# Patient Record
Sex: FEMALE | Race: WHITE
[De-identification: names, ages, dates, MRNs, and addresses within clinical notes are randomized per-mention and may not be internally consistent; named-entity substitution may affect disease eponyms.]

---

## 2019-07-10 ENCOUNTER — FORM ENCOUNTER (OUTPATIENT)
Age: 59
End: 2019-07-10

## 2019-07-14 ENCOUNTER — FORM ENCOUNTER (OUTPATIENT)
Age: 59
End: 2019-07-14

## 2020-02-12 ENCOUNTER — FORM ENCOUNTER (OUTPATIENT)
Age: 60
End: 2020-02-12

## 2020-07-08 ENCOUNTER — FORM ENCOUNTER (OUTPATIENT)
Age: 60
End: 2020-07-08

## 2020-10-20 ENCOUNTER — APPOINTMENT (OUTPATIENT)
Dept: SURGERY | Facility: CLINIC | Age: 60
End: 2020-10-20
Payer: COMMERCIAL

## 2020-10-20 VITALS — HEART RATE: 72 BPM | DIASTOLIC BLOOD PRESSURE: 82 MMHG | SYSTOLIC BLOOD PRESSURE: 126 MMHG | OXYGEN SATURATION: 100 %

## 2020-10-20 VITALS — TEMPERATURE: 97.4 F

## 2020-10-20 DIAGNOSIS — Z86.69 PERSONAL HISTORY OF OTHER DISEASES OF THE NERVOUS SYSTEM AND SENSE ORGANS: ICD-10-CM

## 2020-10-20 DIAGNOSIS — Z78.9 OTHER SPECIFIED HEALTH STATUS: ICD-10-CM

## 2020-10-20 PROCEDURE — 99204 OFFICE O/P NEW MOD 45 MIN: CPT

## 2020-10-20 PROCEDURE — 99072 ADDL SUPL MATRL&STAF TM PHE: CPT

## 2020-10-20 NOTE — PHYSICAL EXAM
[Normal Thyroid] : the thyroid was normal [Normal Breath Sounds] : Normal breath sounds [Normal Heart Sounds] : normal heart sounds [Normal Rate and Rhythm] : normal rate and rhythm [Oriented to Person] : oriented to person [No Rash or Lesion] : No rash or lesion [Oriented to Place] : oriented to place [Oriented to Time] : oriented to time [Calm] : calm [JVD] : no jugular venous distention  [Carotid Bruits] : no carotid bruits [Abdominal Masses] : No abdominal masses [Abdomen Tenderness] : ~T ~M No abdominal tenderness [Tender] : was nontender [Enlarged] : not enlarged [de-identified] : well developed white female in no acute distress [de-identified] : noninjected and non icteric [de-identified] : without adenopathy [de-identified] : refused [de-identified] : normal bowel sounds, without distension or tenderness [de-identified] : very tender over the left internal ring, without palpable hernia [de-identified] : refused [de-identified] : without calf pain or swelling

## 2020-10-20 NOTE — HISTORY OF PRESENT ILLNESS
[de-identified] : left inguinal pain [de-identified] : 60 year old white female who presents c/o left groin pain to the past five months.  Pt states her pain is constant and does not radiate. It decreases with walking, no alleviating factors. She has not noticed a lump or swelling.  She has had no change in her bowel habits.  Pt was seen by GYN, told not GYN. She was also seen by GI who did a CT scan that was negative and a colonoscopy.  She denies urinary symptoms.

## 2020-11-19 ENCOUNTER — APPOINTMENT (OUTPATIENT)
Dept: SURGERY | Facility: CLINIC | Age: 60
End: 2020-11-19
Payer: COMMERCIAL

## 2020-11-19 VITALS — TEMPERATURE: 97.3 F

## 2020-11-19 PROCEDURE — 99213 OFFICE O/P EST LOW 20 MIN: CPT

## 2020-11-19 NOTE — HISTORY OF PRESENT ILLNESS
[de-identified] : left inguinal pain [de-identified] : pt improved, decreased pain, normal GI functioning, denies any lumps or swelling

## 2020-11-19 NOTE — PHYSICAL EXAM
[Normal Breath Sounds] : Normal breath sounds [Normal Heart Sounds] : normal heart sounds [Normal Rate and Rhythm] : normal rate and rhythm [Abdominal Masses] : No abdominal masses [Abdomen Tenderness] : ~T ~M No abdominal tenderness [Tender] : was nontender [Enlarged] : not enlarged [No Rash or Lesion] : No rash or lesion [Alert] : alert [Oriented to Person] : oriented to person [Oriented to Place] : oriented to place [Calm] : calm [de-identified] : well developed white female in no acute distress [de-identified] : without adenopathy [de-identified] : benign [de-identified] : without palpable hernia [de-identified] : without calf pain or swelling

## 2020-12-03 ENCOUNTER — TRANSCRIPTION ENCOUNTER (OUTPATIENT)
Age: 60
End: 2020-12-03

## 2021-03-11 ENCOUNTER — APPOINTMENT (OUTPATIENT)
Dept: SURGERY | Facility: CLINIC | Age: 61
End: 2021-03-11
Payer: COMMERCIAL

## 2021-03-11 VITALS — TEMPERATURE: 97.8 F

## 2021-03-11 PROCEDURE — 99072 ADDL SUPL MATRL&STAF TM PHE: CPT

## 2021-03-11 PROCEDURE — 99213 OFFICE O/P EST LOW 20 MIN: CPT

## 2021-03-11 NOTE — HISTORY OF PRESENT ILLNESS
[de-identified] : left inguinal pain [de-identified] : pt was doing well till one week ago when she did heavy lifting and developed left groin pain. She 'felt a tearing'. She denies any lumps or swelling. Normal GI functioning.

## 2021-03-11 NOTE — PHYSICAL EXAM
[Normal Breath Sounds] : Normal breath sounds [Normal Heart Sounds] : normal heart sounds [No Rash or Lesion] : No rash or lesion [Alert] : alert [Oriented to Person] : oriented to person [Oriented to Place] : oriented to place [Oriented to Time] : oriented to time [Calm] : calm [de-identified] : well developed white female in no acute distress [de-identified] : normal bowel sounds, without distension or tenderness. [de-identified] : without swelling or palpable hernias [de-identified] : without calf pain or swelling

## 2021-04-08 ENCOUNTER — APPOINTMENT (OUTPATIENT)
Dept: SURGERY | Facility: CLINIC | Age: 61
End: 2021-04-08
Payer: COMMERCIAL

## 2021-04-08 VITALS — TEMPERATURE: 97.8 F

## 2021-04-08 PROCEDURE — 99072 ADDL SUPL MATRL&STAF TM PHE: CPT

## 2021-04-08 PROCEDURE — 99212 OFFICE O/P EST SF 10 MIN: CPT

## 2021-04-08 NOTE — PHYSICAL EXAM
[Normal Breath Sounds] : Normal breath sounds [Normal Heart Sounds] : normal heart sounds [Normal Rate and Rhythm] : normal rate and rhythm [Calm] : calm [de-identified] : well developed female in no acute distress [de-identified] : presently without tenderness, without palpable hernia [de-identified] : normal bowel sounds, without distension or tenderness. [de-identified] : without calf pain or swelling

## 2021-04-08 NOTE — HISTORY OF PRESENT ILLNESS
[de-identified] : left inguinal pain [de-identified] : pt improved with decreased pain, without swelling or lumps, Normal GI functioning, without fevers or chills.

## 2021-06-14 ENCOUNTER — FORM ENCOUNTER (OUTPATIENT)
Age: 61
End: 2021-06-14

## 2021-07-02 ENCOUNTER — NON-APPOINTMENT (OUTPATIENT)
Age: 61
End: 2021-07-02

## 2021-07-07 ENCOUNTER — APPOINTMENT (OUTPATIENT)
Dept: SPEECH THERAPY | Facility: CLINIC | Age: 61
End: 2021-07-07

## 2021-07-07 ENCOUNTER — OUTPATIENT (OUTPATIENT)
Dept: OUTPATIENT SERVICES | Facility: HOSPITAL | Age: 61
LOS: 1 days | Discharge: HOME | End: 2021-07-07

## 2021-07-07 DIAGNOSIS — Z98.89 OTHER SPECIFIED POSTPROCEDURAL STATES: Chronic | ICD-10-CM

## 2021-07-15 ENCOUNTER — APPOINTMENT (OUTPATIENT)
Dept: SURGERY | Facility: CLINIC | Age: 61
End: 2021-07-15
Payer: COMMERCIAL

## 2021-07-15 VITALS — TEMPERATURE: 98.5 F

## 2021-07-15 PROCEDURE — 99212 OFFICE O/P EST SF 10 MIN: CPT

## 2021-07-15 PROCEDURE — 99072 ADDL SUPL MATRL&STAF TM PHE: CPT

## 2021-07-15 NOTE — HISTORY OF PRESENT ILLNESS
[de-identified] : left inguinal pain [de-identified] : pt states that her pain has lessened but she still has occasional pain in the left lower quadrant. She does not feel any lumps or swelling in the area. She has not had any changes in her bowel habits.

## 2021-07-15 NOTE — PHYSICAL EXAM
[Normal Breath Sounds] : Normal breath sounds [Normal Heart Sounds] : normal heart sounds [Normal Rate and Rhythm] : normal rate and rhythm [No Rash or Lesion] : No rash or lesion [Alert] : alert [Oriented to Person] : oriented to person [Oriented to Place] : oriented to place [Oriented to Time] : oriented to time [Calm] : calm [de-identified] : pt in no distress [de-identified] : normal bowel sounds, without distension or tenderness [de-identified] : left groin without adenopathy or hernia, right side also normal [de-identified] : pt refused [de-identified] : without calf pain or swelling

## 2021-07-17 ENCOUNTER — FORM ENCOUNTER (OUTPATIENT)
Age: 61
End: 2021-07-17

## 2021-08-02 DIAGNOSIS — R42 DIZZINESS AND GIDDINESS: ICD-10-CM

## 2021-08-13 ENCOUNTER — APPOINTMENT (OUTPATIENT)
Dept: MRI IMAGING | Facility: HOSPITAL | Age: 61
End: 2021-08-13
Payer: COMMERCIAL

## 2021-08-13 ENCOUNTER — OUTPATIENT (OUTPATIENT)
Dept: OUTPATIENT SERVICES | Facility: HOSPITAL | Age: 61
LOS: 1 days | End: 2021-08-13
Payer: COMMERCIAL

## 2021-08-13 DIAGNOSIS — Z00.8 ENCOUNTER FOR OTHER GENERAL EXAMINATION: ICD-10-CM

## 2021-08-13 DIAGNOSIS — Z98.89 OTHER SPECIFIED POSTPROCEDURAL STATES: Chronic | ICD-10-CM

## 2021-08-13 PROCEDURE — 73718 MRI LOWER EXTREMITY W/O DYE: CPT

## 2021-08-13 PROCEDURE — 73718 MRI LOWER EXTREMITY W/O DYE: CPT | Mod: 26,LT

## 2022-01-20 ENCOUNTER — APPOINTMENT (OUTPATIENT)
Dept: SURGERY | Facility: CLINIC | Age: 62
End: 2022-01-20
Payer: COMMERCIAL

## 2022-01-20 VITALS — TEMPERATURE: 95 F

## 2022-01-20 PROCEDURE — 99212 OFFICE O/P EST SF 10 MIN: CPT

## 2022-01-20 NOTE — PHYSICAL EXAM
[Normal Breath Sounds] : Normal breath sounds [Normal Heart Sounds] : normal heart sounds [Normal Rate and Rhythm] : normal rate and rhythm [No Rash or Lesion] : No rash or lesion [Alert] : alert [Oriented to Person] : oriented to person [Oriented to Place] : oriented to place [Oriented to Time] : oriented to time [Calm] : calm [de-identified] : well developed white female in no distress [de-identified] : normal bowel sounds, without distension or tenderness [de-identified] : with mild left groin soreness, without palpable hernia [de-identified] : without calf pain or swelling

## 2022-01-20 NOTE — HISTORY OF PRESENT ILLNESS
[de-identified] : left inguinal pain [de-identified] : pt has been doing well till a few weeks ago when she strained herself. Since then the pain has been decreasing. She does not feel any lumps or swelling.

## 2022-06-01 ENCOUNTER — FORM ENCOUNTER (OUTPATIENT)
Age: 62
End: 2022-06-01

## 2022-08-12 ENCOUNTER — APPOINTMENT (OUTPATIENT)
Dept: OBGYN | Facility: CLINIC | Age: 62
End: 2022-08-12

## 2022-08-12 VITALS
RESPIRATION RATE: 12 BRPM | DIASTOLIC BLOOD PRESSURE: 70 MMHG | SYSTOLIC BLOOD PRESSURE: 110 MMHG | HEART RATE: 79 BPM | HEIGHT: 65 IN | OXYGEN SATURATION: 97 % | BODY MASS INDEX: 30.82 KG/M2 | WEIGHT: 185 LBS

## 2022-08-12 DIAGNOSIS — Z78.9 OTHER SPECIFIED HEALTH STATUS: ICD-10-CM

## 2022-08-12 DIAGNOSIS — Z83.438 FAMILY HISTORY OF OTHER DISORDER OF LIPOPROTEIN METABOLISM AND OTHER LIPIDEMIA: ICD-10-CM

## 2022-08-12 DIAGNOSIS — Z82.49 FAMILY HISTORY OF ISCHEMIC HEART DISEASE AND OTHER DISEASES OF THE CIRCULATORY SYSTEM: ICD-10-CM

## 2022-08-12 DIAGNOSIS — N95.2 POSTMENOPAUSAL ATROPHIC VAGINITIS: ICD-10-CM

## 2022-08-12 DIAGNOSIS — N94.10 UNSPECIFIED DYSPAREUNIA: ICD-10-CM

## 2022-08-12 LAB
DATE COLLECTED: NORMAL
HEMOCCULT SP1 STL QL: NEGATIVE

## 2022-08-12 PROCEDURE — G0444 DEPRESSION SCREEN ANNUAL: CPT | Mod: 59

## 2022-08-12 PROCEDURE — 99396 PREV VISIT EST AGE 40-64: CPT | Mod: 25

## 2022-08-12 PROCEDURE — 82270 OCCULT BLOOD FECES: CPT

## 2022-08-12 NOTE — PLAN
[FreeTextEntry1] : 1)  Self breast exam instructions, calcium supplementation discussed with the patient.\par 2)  Mammography, lipid profile assessment, TSH screening, fasting glucose testing, colonoscopy screening were discussed with the patient.  Vitamin D supplementation\par 3)  Maintain healthy weight.\par 4)  Regular health maintenance with PCP.\par 5)  Remain tobacco free.\par 6)  Limit alcohol intake to less than 5 drinks per week.\par 7)  Osteoporosis screening.\par 8)  Annual cholesterol screening.\par 9)  Annual influenza vaccine.The importance of routine physical activity was reviewed and a goal of 150 minutes of moderately vigorous exercise per week was endorsed.\par Yearly breast cancer screening with no current clinical or radiographic concerns.  Plan continued yearly imaging and breast follow up, sooner as needed.  I counseled the patient on current recommendations to reduce breast cancer risk including but not inclusive to regular exercise 20-30 minutes 3-4 times a week, low fat diet, limiting alcohol consumption, maintenance of ideal body weight, yearly imaging and self breast awareness.  Questions answered.  I encouraged in light of Covid 19, social distancing, frequent hand washing and precautions to stay healthy.\par \par  vaginal atrophy discussed\par Dyspareunia discussed

## 2022-08-12 NOTE — HISTORY OF PRESENT ILLNESS
[N] : Patient does not use contraception [Y] : Positive pregnancy history [Yes] : pregnancy [Regular Cycle Intervals] : periods have been regular [Menarche Age: ____] : age at menarche was [unfilled] [Currently In Menopause] : currently in menopause [Experiencing Menopausal Sxs] : experiencing menopausal symptoms [Currently Active] : currently active [Men] : men [No] : No [TextBox_4] : annual [Mammogramdate] : 08/22 [BreastSonogramDate] : 08/22 [PapSmeardate] : 08/21 [BoneDensityDate] : 01/18 [ColonoscopyDate] : 01/20 [TextBox_78] : no Hx [LMPDate] : 08/2014 [MensesFreq] : 30 [MensesLength] : 7 [PGHxTotal] : 2 [TextBox_6] : 08/2014 [TextBox_9] : 12 [FreeTextEntry1] : 08/2014

## 2022-08-14 LAB
HPV 16 E6+E7 MRNA CVX QL NAA+PROBE: NOT DETECTED
HPV18+45 E6+E7 MRNA CVX QL NAA+PROBE: NOT DETECTED

## 2022-08-18 LAB — CYTOLOGY CVX/VAG DOC THIN PREP: ABNORMAL

## 2023-02-28 ENCOUNTER — APPOINTMENT (OUTPATIENT)
Dept: SURGERY | Facility: CLINIC | Age: 63
End: 2023-02-28
Payer: COMMERCIAL

## 2023-02-28 VITALS
OXYGEN SATURATION: 98 % | WEIGHT: 180 LBS | SYSTOLIC BLOOD PRESSURE: 121 MMHG | BODY MASS INDEX: 29.99 KG/M2 | HEART RATE: 69 BPM | DIASTOLIC BLOOD PRESSURE: 77 MMHG | HEIGHT: 65 IN

## 2023-02-28 PROCEDURE — 99212 OFFICE O/P EST SF 10 MIN: CPT

## 2023-02-28 NOTE — PHYSICAL EXAM
[JVD] : no jugular venous distention  [Normal Thyroid] : the thyroid was normal [Carotid Bruits] : no carotid bruits [Normal Breath Sounds] : Normal breath sounds [Normal Heart Sounds] : normal heart sounds [Normal Rate and Rhythm] : normal rate and rhythm [Calm] : calm [de-identified] : well developed white female in no acute distress [de-identified] : nonicteric [de-identified] : without adenopathy [de-identified] : normal bowel sounds, without distension with left LLQ and groin pain [de-identified] : left groin pain without palpable hernia [de-identified] : without calf pain or swelling

## 2023-02-28 NOTE — HISTORY OF PRESENT ILLNESS
[de-identified] : left inguinal pain [de-identified] : pt c/o increased left groin pain over the last week. It increases with even light touch. She denies any lumps or swelling in the area. No nausea or vomiting or changes in her bowel habits.

## 2023-02-28 NOTE — REVIEW OF SYSTEMS
[Abdominal Pain] : no abdominal pain [Vomiting] : no vomiting [Constipation] : no constipation [Diarrhea] : no diarrhea [Heartburn] : heartburn [Melena] : no melena [Negative] : Heme/Lymph

## 2023-02-28 NOTE — ASSESSMENT
[FreeTextEntry1] : I am sending pt for a ct scan of her abdomen and pelvis with a marker over the tender area.

## 2023-05-16 ENCOUNTER — APPOINTMENT (OUTPATIENT)
Dept: ORTHOPEDIC SURGERY | Facility: CLINIC | Age: 63
End: 2023-05-16

## 2023-08-30 DIAGNOSIS — Z13.820 ENCOUNTER FOR SCREENING FOR OSTEOPOROSIS: ICD-10-CM

## 2023-09-01 ENCOUNTER — APPOINTMENT (OUTPATIENT)
Dept: OBGYN | Facility: CLINIC | Age: 63
End: 2023-09-01
Payer: COMMERCIAL

## 2023-09-01 VITALS
HEART RATE: 68 BPM | WEIGHT: 179 LBS | SYSTOLIC BLOOD PRESSURE: 120 MMHG | BODY MASS INDEX: 29.82 KG/M2 | HEIGHT: 65 IN | DIASTOLIC BLOOD PRESSURE: 76 MMHG | OXYGEN SATURATION: 98 %

## 2023-09-01 DIAGNOSIS — Z01.419 ENCOUNTER FOR GYNECOLOGICAL EXAMINATION (GENERAL) (ROUTINE) W/OUT ABNORMAL FINDINGS: ICD-10-CM

## 2023-09-01 DIAGNOSIS — R07.89 OTHER CHEST PAIN: ICD-10-CM

## 2023-09-01 DIAGNOSIS — S76.212A STRAIN OF ADDUCTOR MUSCLE, FASCIA AND TENDON OF LEFT THIGH, INITIAL ENCOUNTER: ICD-10-CM

## 2023-09-01 DIAGNOSIS — Z13.31 ENCOUNTER FOR SCREENING FOR DEPRESSION: ICD-10-CM

## 2023-09-01 DIAGNOSIS — S76.212D STRAIN OF ADDUCTOR MUSCLE, FASCIA AND TENDON OF LEFT THIGH, SUBSEQUENT ENCOUNTER: ICD-10-CM

## 2023-09-01 DIAGNOSIS — Z12.39 ENCOUNTER FOR OTHER SCREENING FOR MALIGNANT NEOPLASM OF BREAST: ICD-10-CM

## 2023-09-01 DIAGNOSIS — Z12.11 ENCOUNTER FOR SCREENING FOR MALIGNANT NEOPLASM OF COLON: ICD-10-CM

## 2023-09-01 DIAGNOSIS — Z12.4 ENCOUNTER FOR SCREENING FOR MALIGNANT NEOPLASM OF CERVIX: ICD-10-CM

## 2023-09-01 DIAGNOSIS — R10.32 LEFT LOWER QUADRANT PAIN: ICD-10-CM

## 2023-09-01 PROCEDURE — 82270 OCCULT BLOOD FECES: CPT

## 2023-09-01 PROCEDURE — 99396 PREV VISIT EST AGE 40-64: CPT

## 2023-09-01 NOTE — HISTORY OF PRESENT ILLNESS
[Y] : Patient is sexually active [LMP unknown] : LMP unknown [Hot Flashes] : hot flashes [unknown] : Patient is unsure of the date of her LMP [Currently Active] : currently active [FreeTextEntry1] : Pt is here for annual exam. Pt states that she has left breast pain on the side and behind the nipple. The patient presents today for a routine GYN exam.  We reviewed together in detail her past medical and surgical histories, allergies and medication usage, social and family history.   All questions were answered in easy to understand language.  [Mammogramdate] : 08/23 [BreastSonogramDate] : 08/23 [PapSmeardate] : 08/22 [ColonoscopyDate] : 01/20 [BoneDensityDate] : 08/22

## 2023-09-01 NOTE — PHYSICAL EXAM
[Chaperone Present] : A chaperone was present in the examining room during all aspects of the physical examination [Appropriately responsive] : appropriately responsive [Alert] : alert [No Acute Distress] : no acute distress [No Lymphadenopathy] : no lymphadenopathy [Soft] : soft [Non-tender] : non-tender [Non-distended] : non-distended [No HSM] : No HSM [No Lesions] : no lesions [No Mass] : no mass [Oriented x3] : oriented x3 [Examination Of The Breasts] : a normal appearance [No Masses] : no breast masses were palpable [Labia Majora] : normal [Labia Minora] : normal [Normal] : normal [Uterine Adnexae] : normal [FreeTextEntry1] : The documentation for this encounter was entered by Oma Ramirez acting as a scribe for Dr. Guy.  [FreeTextEntry6] : 1-2 o'clock left breast plain demonstrated with deep palpation.  [Occult Blood Positive] : was negative for occult blood analysis

## 2023-09-01 NOTE — PLAN
[FreeTextEntry1] : In regards to the patient's breast pain it appears very consistent with musculoskeletal.  I discussed in great detail that since it is reproducible and is worsened by touch without a corresponding lesion the breast discomfort is consistent with musculoskeletal.  Recommendation is for nonsteroidal anti-inflammatory and follow-up in several weeks.  If without improvement she will be referred to a breast specialist for reevaluation.  I have spent 40 minutes of time on this encounter.  Greater than 50% of the face-to-face encounter time was spent on counseling and/or coordination of care for examination findings, differential, testing, management and planning. 10 minutes were allotted to discussing the depression screening. Yearly breast cancer screening with no current clinical or radiographic concerns.  The patient was reminded regarding future well breast and general healthcare, breast cancer risk reduction, the importance of self-examination and the need for follow up.   She was again reminded of the need to take Vit D3 2500  IU daily or to keep a Vit D level above 30, and Tumeric 1000 mg daily with Black Pepper.  Plan continued yearly imaging and breast follow up, sooner as needed.  I counseled the patient on current recommendations to reduce breast cancer risk including but not inclusive to regular exercise 20-30 minutes 3-4 times a week, low fat diet, limiting alcohol consumption, maintenance of ideal body weight, yearly imaging and self breast awareness.  Questions answered.  I encouraged in light of Covid 19, social distancing, frequent hand washing and precautions to stay healthy.  1)  Self breast exam instructions, calcium supplementation discussed with the patient. 2)  Mammography, lipid profile assessment, TSH screening, fasting glucose testing, colonoscopy screening were discussed with the patient.  Vitamin D supplementation 3)  Maintain healthy weight. 4)  Regular health maintenance with PCP. 5)  Remain tobacco free. 6)  Limit alcohol intake to less than 5 drinks per week. 7)  Osteoporosis screening. 8)  Annual cholesterol screening. 9)  Annual influenza vaccine.The importance of routine physical activity was reviewed and a goal of 150 minutes of moderately vigorous exercise per week was endorsed.   Advised to take Advil for breast pain as well as follow-up with a breast specialist/surgeon. All questions were answered.

## 2023-09-05 LAB
HPV 16 E6+E7 MRNA CVX QL NAA+PROBE: NOT DETECTED
HPV18+45 E6+E7 MRNA CVX QL NAA+PROBE: NOT DETECTED
SOURCE AMPLIFICATION: NORMAL
T VAGINALIS RRNA SPEC QL NAA+PROBE: NOT DETECTED

## 2023-09-08 LAB — CYTOLOGY CVX/VAG DOC THIN PREP: ABNORMAL

## 2023-11-10 ENCOUNTER — APPOINTMENT (OUTPATIENT)
Dept: BREAST CENTER | Facility: CLINIC | Age: 63
End: 2023-11-10
Payer: COMMERCIAL

## 2023-11-10 VITALS
OXYGEN SATURATION: 98 % | HEART RATE: 62 BPM | SYSTOLIC BLOOD PRESSURE: 116 MMHG | BODY MASS INDEX: 29.66 KG/M2 | DIASTOLIC BLOOD PRESSURE: 75 MMHG | HEIGHT: 65 IN | WEIGHT: 178 LBS

## 2023-11-10 DIAGNOSIS — N64.4 MASTODYNIA: ICD-10-CM

## 2023-11-10 DIAGNOSIS — Z12.39 ENCOUNTER FOR OTHER SCREENING FOR MALIGNANT NEOPLASM OF BREAST: ICD-10-CM

## 2023-11-10 DIAGNOSIS — Z80.0 FAMILY HISTORY OF MALIGNANT NEOPLASM OF DIGESTIVE ORGANS: ICD-10-CM

## 2023-11-10 DIAGNOSIS — Z84.89 FAMILY HISTORY OF OTHER SPECIFIED CONDITIONS: ICD-10-CM

## 2023-11-10 PROCEDURE — 99204 OFFICE O/P NEW MOD 45 MIN: CPT

## 2023-11-10 RX ORDER — MECLIZINE HYDROCHLORIDE 25 MG/1
TABLET ORAL
Refills: 0 | Status: ACTIVE | COMMUNITY

## 2024-11-14 DIAGNOSIS — Z13.820 ENCOUNTER FOR SCREENING FOR OSTEOPOROSIS: ICD-10-CM

## 2024-11-14 DIAGNOSIS — Z11.3 ENCOUNTER FOR SCREENING FOR INFECTIONS WITH A PREDOMINANTLY SEXUAL MODE OF TRANSMISSION: ICD-10-CM

## 2024-11-15 ENCOUNTER — LABORATORY RESULT (OUTPATIENT)
Age: 64
End: 2024-11-15

## 2024-11-15 ENCOUNTER — APPOINTMENT (OUTPATIENT)
Dept: OBGYN | Facility: CLINIC | Age: 64
End: 2024-11-15
Payer: COMMERCIAL

## 2024-11-15 VITALS
OXYGEN SATURATION: 98 % | DIASTOLIC BLOOD PRESSURE: 78 MMHG | WEIGHT: 178 LBS | HEART RATE: 77 BPM | BODY MASS INDEX: 29.66 KG/M2 | RESPIRATION RATE: 14 BRPM | SYSTOLIC BLOOD PRESSURE: 120 MMHG | HEIGHT: 65 IN

## 2024-11-15 DIAGNOSIS — Z13.31 ENCOUNTER FOR SCREENING FOR DEPRESSION: ICD-10-CM

## 2024-11-15 DIAGNOSIS — Z12.39 ENCOUNTER FOR OTHER SCREENING FOR MALIGNANT NEOPLASM OF BREAST: ICD-10-CM

## 2024-11-15 DIAGNOSIS — Z12.11 ENCOUNTER FOR SCREENING FOR MALIGNANT NEOPLASM OF COLON: ICD-10-CM

## 2024-11-15 DIAGNOSIS — N95.2 POSTMENOPAUSAL ATROPHIC VAGINITIS: ICD-10-CM

## 2024-11-15 DIAGNOSIS — Z01.419 ENCOUNTER FOR GYNECOLOGICAL EXAMINATION (GENERAL) (ROUTINE) W/OUT ABNORMAL FINDINGS: ICD-10-CM

## 2024-11-15 DIAGNOSIS — Z12.4 ENCOUNTER FOR SCREENING FOR MALIGNANT NEOPLASM OF CERVIX: ICD-10-CM

## 2024-11-15 PROCEDURE — 99396 PREV VISIT EST AGE 40-64: CPT

## 2024-11-15 PROCEDURE — 82270 OCCULT BLOOD FECES: CPT

## 2024-11-15 PROCEDURE — 99459 PELVIC EXAMINATION: CPT

## 2024-11-20 LAB — CYTOLOGY CVX/VAG DOC THIN PREP: ABNORMAL

## 2025-08-21 ENCOUNTER — APPOINTMENT (OUTPATIENT)
Dept: ORTHOPEDIC SURGERY | Facility: CLINIC | Age: 65
End: 2025-08-21
Payer: COMMERCIAL

## 2025-08-21 DIAGNOSIS — M18.11 UNILATERAL PRIMARY OSTEOARTHRITIS OF FIRST CARPOMETACARPAL JOINT, RIGHT HAND: ICD-10-CM

## 2025-08-21 DIAGNOSIS — M79.642 PAIN IN LEFT HAND: ICD-10-CM

## 2025-08-21 PROCEDURE — 73130 X-RAY EXAM OF HAND: CPT | Mod: 50

## 2025-08-21 PROCEDURE — 99204 OFFICE O/P NEW MOD 45 MIN: CPT

## 2025-08-21 RX ORDER — DICLOFENAC SODIUM 10 MG/G
1 GEL TOPICAL
Qty: 1 | Refills: 2 | Status: ACTIVE | COMMUNITY
Start: 2025-08-21 | End: 1900-01-01